# Patient Record
Sex: FEMALE | Race: WHITE | Employment: UNEMPLOYED | URBAN - METROPOLITAN AREA
[De-identification: names, ages, dates, MRNs, and addresses within clinical notes are randomized per-mention and may not be internally consistent; named-entity substitution may affect disease eponyms.]

---
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
## 2025-03-12 ENCOUNTER — HOSPITAL ENCOUNTER (EMERGENCY)
Facility: CLINIC | Age: 20
Discharge: HOME | End: 2025-03-12
Attending: STUDENT IN AN ORGANIZED HEALTH CARE EDUCATION/TRAINING PROGRAM
Payer: COMMERCIAL

## 2025-03-12 ENCOUNTER — APPOINTMENT (OUTPATIENT)
Dept: GENERAL RADIOLOGY | Facility: CLINIC | Age: 20
End: 2025-03-12
Payer: COMMERCIAL

## 2025-03-12 VITALS
RESPIRATION RATE: 19 BRPM | HEART RATE: 77 BPM | BODY MASS INDEX: 29.64 KG/M2 | OXYGEN SATURATION: 99 % | HEIGHT: 61 IN | SYSTOLIC BLOOD PRESSURE: 131 MMHG | DIASTOLIC BLOOD PRESSURE: 78 MMHG | WEIGHT: 157 LBS | TEMPERATURE: 98.5 F

## 2025-03-12 DIAGNOSIS — S76.911A MUSCLE STRAIN OF RIGHT THIGH, INITIAL ENCOUNTER: Primary | ICD-10-CM

## 2025-03-12 PROCEDURE — 6370000000 HC RX 637 (ALT 250 FOR IP): Performed by: STUDENT IN AN ORGANIZED HEALTH CARE EDUCATION/TRAINING PROGRAM

## 2025-03-12 PROCEDURE — 99283 EMERGENCY DEPT VISIT LOW MDM: CPT

## 2025-03-12 PROCEDURE — 73552 X-RAY EXAM OF FEMUR 2/>: CPT

## 2025-03-12 RX ORDER — CYCLOBENZAPRINE HCL 10 MG
10 TABLET ORAL 3 TIMES DAILY PRN
Qty: 30 TABLET | Refills: 0 | Status: SHIPPED | OUTPATIENT
Start: 2025-03-12 | End: 2025-03-22

## 2025-03-12 RX ORDER — ACETAMINOPHEN 500 MG
1000 TABLET ORAL ONCE
Status: COMPLETED | OUTPATIENT
Start: 2025-03-12 | End: 2025-03-12

## 2025-03-12 RX ORDER — IBUPROFEN 800 MG/1
800 TABLET, FILM COATED ORAL EVERY 6 HOURS PRN
Qty: 21 TABLET | Refills: 0 | Status: SHIPPED | OUTPATIENT
Start: 2025-03-12

## 2025-03-12 RX ORDER — IBUPROFEN 800 MG/1
800 TABLET, FILM COATED ORAL ONCE
Status: COMPLETED | OUTPATIENT
Start: 2025-03-12 | End: 2025-03-12

## 2025-03-12 RX ADMIN — IBUPROFEN 800 MG: 800 TABLET ORAL at 12:51

## 2025-03-12 RX ADMIN — ACETAMINOPHEN 1000 MG: 500 TABLET ORAL at 12:51

## 2025-03-12 ASSESSMENT — PAIN SCALES - GENERAL
PAINLEVEL_OUTOF10: 3
PAINLEVEL_OUTOF10: 6

## 2025-03-12 ASSESSMENT — ENCOUNTER SYMPTOMS: BACK PAIN: 0

## 2025-03-12 NOTE — DISCHARGE INSTRUCTIONS
We evaluated you for your leg pain.  Use the crutches.  See the orthopedic team tomorrow.  Use the medication as prescribed for your symptoms.    Follow-up closely with your primary doctor.    Return to the emergency department you develop any worsening or concerning symptoms.

## 2025-03-12 NOTE — ED PROVIDER NOTES
[facility]  [address] [facility] [address]  Phone: [phone]        [facility] EMERGENCY DEPARTMENT  EMERGENCY DEPARTMENT ENCOUNTER      Pt Name: Betzaida Hudson  MRN: [id number]  Birthdate 2005  Date of evaluation: 3/12/2025  Provider: Jazmyn Cuadra DO    CHIEF COMPLAINT       Chief Complaint   Patient presents with    Leg Injury     Right thigh - pt heard a snap during her baton routine - last night approx  .        HISTORY OF PRESENT ILLNESS   (Location/Symptom, Timing/Onset,Context/Setting, Quality, Duration, Modifying Factors, Severity)  Note limiting factors.     Betzaida Hudson is a 19 y.o. female who presents to the emergency department with right leg pain.  Patient states she was at her bedtime routine last night when she attempted to do some type of move where she keeps her left leg on the ground and raises her right leg.  Patient believes she may have hyperextended her right leg, when she did this she felt immediate pain in her right leg and felt something pop.  Patient states she tore her hamstring on this leg many years ago.  States it feels very similar.  States that she still is able to walk.  Not currently following up with any orthopedic doctors.  Took some Tylenol last evening with some improvement in her symptoms.    Nursing Notes were reviewed.    REVIEW OF SYSTEMS       Review of Systems   Constitutional:  Negative for chills and fever.   Musculoskeletal:  Positive for arthralgias and myalgias. Negative for back pain and neck pain.   Skin:  Negative for rash and wound.       PAST MEDICAL HISTORY     Past Medical History:   Diagnosis Date    Anxiety     Depression     History of ear infection     Hydronephrosis     right    Immunizations up to date     stated per mother    Kidney stone     No passive smoke exposure     Term birth of      5lb1oz 19in/fullterm delivery/no complications    Urinary tract infection     Wellness examination

## 2025-03-13 ENCOUNTER — OFFICE VISIT (OUTPATIENT)
Dept: ORTHOPEDIC SURGERY | Age: 20
End: 2025-03-13
Payer: COMMERCIAL

## 2025-03-13 VITALS — OXYGEN SATURATION: 99 % | WEIGHT: 163 LBS | BODY MASS INDEX: 30.78 KG/M2 | RESPIRATION RATE: 17 BRPM | HEIGHT: 61 IN

## 2025-03-13 DIAGNOSIS — S76.311A HAMSTRING MUSCLE STRAIN, RIGHT, INITIAL ENCOUNTER: ICD-10-CM

## 2025-03-13 DIAGNOSIS — M54.41 ACUTE RIGHT-SIDED LOW BACK PAIN WITH RIGHT-SIDED SCIATICA: Primary | ICD-10-CM

## 2025-03-13 PROCEDURE — 99204 OFFICE O/P NEW MOD 45 MIN: CPT | Performed by: PHYSICIAN ASSISTANT

## 2025-03-13 RX ORDER — METHYLPREDNISOLONE 4 MG/1
TABLET ORAL
Qty: 1 KIT | Refills: 0 | Status: SHIPPED | OUTPATIENT
Start: 2025-03-13

## 2025-03-13 ASSESSMENT — ENCOUNTER SYMPTOMS
DIARRHEA: 0
NAUSEA: 0
ABDOMINAL PAIN: 0
SHORTNESS OF BREATH: 0
GASTROINTESTINAL NEGATIVE: 1
COUGH: 0
COLOR CHANGE: 0
RESPIRATORY NEGATIVE: 1
VOMITING: 0
APNEA: 0
CHEST TIGHTNESS: 0
CONSTIPATION: 0
ABDOMINAL DISTENTION: 0

## 2025-03-13 NOTE — PATIENT INSTRUCTIONS
PATIENTIQ:  PatientIQ helps [facility] stay in touch with you to know how you're feeling, and provides education and care instructions to you at various time points.   Your answers help your care team track your progress to provide the best care possible. PatientIQ will contact you pre-op and post-op via email or text with:  Educational Videos and Care Instructions  Questionnaires About How You're Feeling    Your participation provides you valuable education and helps [facility] continue to provide quality care to all patients. Thank you

## 2025-03-13 NOTE — PROGRESS NOTES
yesterday baton twirling. Pt states she hyperextended her right leg FINDINGS: The femur appears unremarkable, with no evidence for any fractures, lytic lesions, blastic lesions or periosteal reactions. The hip joint and the knee joint appear normal. Soft tissues appear unremarkable.     No radiographic evidence of acute right femur fracture.         Procedure: None    ASSESSMENT:     1. Acute right-sided low back pain with right-sided sciatica    2. Hamstring muscle strain, right, initial encounter           PLAN:  Assessment & Plan  1. Sciatica of the right leg.  The symptoms suggest a possible mild hamstring strain, but the absence of deformity or discoloration indicates no significant injury. The pain experienced during back movement, rather than leg movement, suggests acute right sided sciatica. The hamstring appears intact, and there is no evidence of a proximal tendon rupture. The hip joint also appears normal. The pain is likely due to muscle spasms in the back, which can exert pressure on the nerves extending into the leg. The use of crutches is recommended to prevent falls. The application of a compression bandage and icing are also suggested. Bending over may exacerbate the condition. A prescription for a Medrol Dosepak will be provided, to be taken as 6 pills on the first day, then decreasing by one pill each day until completion. She is advised not to take ibuprofen concurrently with the Medrol Dosepak. Upon completion of the Medrol Dosepak, she may resume ibuprofen if necessary. A referral for physical therapy will be made to address the back pain.  Exercises were placed in her after visit summary.  She is encouraged to perform stretches that do not cause pain. She is advised to avoid pedicures for 2 weeks. A note will be provided to excuse her from work until Monday. If there is no improvement with therapy, x-rays and an MRI of the lumbar spine will be considered.    Follow-up  The patient will follow

## 2025-05-06 ENCOUNTER — TELEPHONE (OUTPATIENT)
Dept: ORTHOPEDIC SURGERY | Age: 20
End: 2025-05-06

## 2025-05-06 NOTE — TELEPHONE ENCOUNTER
Received call from patient requesting to speak to Mary Grace's manager or superior about her appt in March 2025 - pt did not wish to give any further details.    Please advise.    Call back #: [phone]

## 2025-07-30 ENCOUNTER — OFFICE VISIT (OUTPATIENT)
Age: 20
End: 2025-07-30

## 2025-07-30 VITALS
OXYGEN SATURATION: 99 % | BODY MASS INDEX: 31.72 KG/M2 | RESPIRATION RATE: 18 BRPM | TEMPERATURE: 98.1 F | HEIGHT: 61 IN | HEART RATE: 86 BPM | DIASTOLIC BLOOD PRESSURE: 88 MMHG | SYSTOLIC BLOOD PRESSURE: 121 MMHG | WEIGHT: 168 LBS

## 2025-07-30 DIAGNOSIS — M62.838 MUSCLE SPASM: ICD-10-CM

## 2025-07-30 DIAGNOSIS — M54.2 CERVICALGIA: Primary | ICD-10-CM

## 2025-07-30 RX ORDER — IBUPROFEN 800 MG/1
800 TABLET, FILM COATED ORAL EVERY 8 HOURS PRN
Qty: 180 TABLET | Refills: 1 | Status: SHIPPED | OUTPATIENT
Start: 2025-07-30

## 2025-07-30 RX ORDER — CYCLOBENZAPRINE HCL 5 MG
5 TABLET ORAL 2 TIMES DAILY PRN
Qty: 10 TABLET | Refills: 0 | Status: SHIPPED | OUTPATIENT
Start: 2025-07-30 | End: 2025-08-09

## 2025-07-30 RX ORDER — PREDNISONE 20 MG/1
20 TABLET ORAL DAILY
Qty: 5 TABLET | Refills: 0 | Status: SHIPPED | OUTPATIENT
Start: 2025-07-30 | End: 2025-08-04

## 2025-07-30 ASSESSMENT — ENCOUNTER SYMPTOMS
EYE PAIN: 0
VOMITING: 0
SINUS PAIN: 0
ABDOMINAL PAIN: 0
SORE THROAT: 0
TROUBLE SWALLOWING: 0
EYES NEGATIVE: 1
BLOOD IN STOOL: 0
ALLERGIC/IMMUNOLOGIC NEGATIVE: 1
COLOR CHANGE: 0
SINUS PRESSURE: 0
DIARRHEA: 0
RESPIRATORY NEGATIVE: 1
SHORTNESS OF BREATH: 0
NAUSEA: 0
COUGH: 0
EYE DISCHARGE: 0
CONSTIPATION: 0
GASTROINTESTINAL NEGATIVE: 1

## 2025-07-30 NOTE — PROGRESS NOTES
[facility] [place] [id number] [facility] [address]  Phone: [phone]  Fax: [phone]      Betzaida Hudson  2005  MRN: [id number]  Date of visit: 7/30/2025      Chief complaint(s): No chief complaint on file.      HPI    Betzaida Hudson  is a  20 y.o. female  patient presented to the urgent care today  for evaluation of  stiff neck left side. She was getting dressed for work yesterday when she noticed a pain in the right side of her shoulder/neck. No known injury. She went to the chiropractor for an adjustment they were unable to do any adjustment at that time patient was sent for a  2-3 view cervical x-ray .She has a history of cervical algia. Impression report per x-ray: straightening of normal lordosis suggest there may be soft tissue injury or muscle spasm no evidence of acute fracture or focal suspicious osseous lesion to the limits of x-ray evaluation.  Patient here requesting muscle relaxer and steroid on behalf of chiropractor she is to return to work on Monday, August 4 and is to return to her chiropractor on Friday for a follow-up exam. no fever . No sore throat or difficulty swallowing . No earache .  No discharge or bleeding from the ear.  No hearing changes.  No headache or dizziness . No neck stiffness or pain. No vision changes.  No blurred or double vision.  No chest pain , shortness of breath or cough . No hemoptysis . No palpitations , exertional dyspnea or orthopnea .  Denies abdominal or back pain.  No nausea or vomiting.  No change in urination or bowel movement.  No neurovascular or motor changes in upper or lower extremities.  No rash.  No recent travel.   Patient denies starting any new medications .  All review of systems are mentioned in the HPI otherwise unremarkable.           PAST MEDICAL HISTORY    Past Medical History:   Diagnosis Date    Anxiety     Depression     History of ear infection     Hydronephrosis     right    Immunizations up to

## (undated) DEVICE — FIBER LSR DIA200UM FLEXSHIELD COAT HOLM HI PWR TRAC TIP

## (undated) DEVICE — ADAPTER URO SCP UROLOK LL

## (undated) DEVICE — PACK PROCEDURE SURG CYSTO SVMMC LF

## (undated) DEVICE — URETEROSCOPE FLX DIGITAL 3.1X650 MM 1.2 MM AXIS DISP

## (undated) DEVICE — GARMENT,MEDLINE,DVT,INT,CALF,MED, GEN2: Brand: MEDLINE

## (undated) DEVICE — SINGLE ACTION PUMPING SYSTEM

## (undated) DEVICE — STRIP,CLOSURE,WOUND,MEDI-STRIP,1/2X4: Brand: MEDLINE

## (undated) DEVICE — GLOVE ORANGE PI 7   MSG9070

## (undated) DEVICE — C-ARM: Brand: UNBRANDED

## (undated) DEVICE — PLUG,CATHETER,DRAINAGE PROTECTOR,TUBE: Brand: MEDLINE

## (undated) DEVICE — APPLICATOR MEDICATED 10.5 CC SOLUTION HI LT ORNG CHLORAPREP

## (undated) DEVICE — NITINOL STONE RETRIEVAL BASKET: Brand: ZERO TIP

## (undated) DEVICE — ENCORE® LATEX TEXTURED SIZE 6.5, STERILE LATEX POWDER-FREE SURGICAL GLOVE: Brand: ENCORE

## (undated) DEVICE — CONTAINER,SPECIMEN,4OZ,OR STRL: Brand: MEDLINE

## (undated) DEVICE — GUIDEWIRE URO L145CM DIA0.035IN TIP L7CM S STL PTFE BENT

## (undated) DEVICE — LITHOTRIPSY TREATMENT

## (undated) DEVICE — 3M™ STERI-STRIP™ COMPOUND BENZOIN TINCTURE 40 BAGS/CARTON 4 CARTONS/CASE C1544: Brand: 3M™ STERI-STRIP™

## (undated) DEVICE — DRAPE,REIN 53X77,STERILE: Brand: MEDLINE

## (undated) DEVICE — GOWN,SIRUS,NONRNF,SETINSLV,XL,20/CS: Brand: MEDLINE

## (undated) DEVICE — CONE TIP URETERAL CATHETER WITH OPEN-END: Brand: CONE TIP

## (undated) DEVICE — GLOVE ORANGE PI 7 1/2   MSG9075

## (undated) DEVICE — GUIDEWIRE URO L150CM DIA0.035IN STIFF NIT HYDRPHLC STR TIP

## (undated) DEVICE — BAG,LEG,COMFORT-STRAPS,MEDIUM,20OZ: Brand: MEDLINE